# Patient Record
Sex: MALE | Race: WHITE | NOT HISPANIC OR LATINO
[De-identification: names, ages, dates, MRNs, and addresses within clinical notes are randomized per-mention and may not be internally consistent; named-entity substitution may affect disease eponyms.]

---

## 2018-05-02 ENCOUNTER — APPOINTMENT (OUTPATIENT)
Dept: ORTHOPEDIC SURGERY | Facility: CLINIC | Age: 63
End: 2018-05-02
Payer: COMMERCIAL

## 2018-05-02 PROCEDURE — 99213 OFFICE O/P EST LOW 20 MIN: CPT

## 2018-05-02 PROCEDURE — 73522 X-RAY EXAM HIPS BI 3-4 VIEWS: CPT

## 2018-05-08 ENCOUNTER — FORM ENCOUNTER (OUTPATIENT)
Age: 63
End: 2018-05-08

## 2018-05-09 ENCOUNTER — APPOINTMENT (OUTPATIENT)
Dept: MRI IMAGING | Facility: CLINIC | Age: 63
End: 2018-05-09

## 2018-05-09 ENCOUNTER — OUTPATIENT (OUTPATIENT)
Dept: OUTPATIENT SERVICES | Facility: HOSPITAL | Age: 63
LOS: 1 days | End: 2018-05-09
Payer: COMMERCIAL

## 2018-05-09 DIAGNOSIS — Z00.8 ENCOUNTER FOR OTHER GENERAL EXAMINATION: ICD-10-CM

## 2018-05-09 DIAGNOSIS — Z96.60 PRESENCE OF UNSPECIFIED ORTHOPEDIC JOINT IMPLANT: Chronic | ICD-10-CM

## 2018-05-09 PROCEDURE — 73721 MRI JNT OF LWR EXTRE W/O DYE: CPT

## 2018-05-09 PROCEDURE — 73721 MRI JNT OF LWR EXTRE W/O DYE: CPT | Mod: 26,LT

## 2018-06-06 ENCOUNTER — OUTPATIENT (OUTPATIENT)
Dept: OUTPATIENT SERVICES | Facility: HOSPITAL | Age: 63
LOS: 1 days | End: 2018-06-06
Payer: COMMERCIAL

## 2018-06-06 ENCOUNTER — APPOINTMENT (OUTPATIENT)
Dept: ULTRASOUND IMAGING | Facility: CLINIC | Age: 63
End: 2018-06-06
Payer: COMMERCIAL

## 2018-06-06 DIAGNOSIS — Z96.60 PRESENCE OF UNSPECIFIED ORTHOPEDIC JOINT IMPLANT: Chronic | ICD-10-CM

## 2018-06-06 DIAGNOSIS — Z00.8 ENCOUNTER FOR OTHER GENERAL EXAMINATION: ICD-10-CM

## 2018-06-06 PROCEDURE — 20611 DRAIN/INJ JOINT/BURSA W/US: CPT | Mod: LT

## 2018-06-06 PROCEDURE — 20611 DRAIN/INJ JOINT/BURSA W/US: CPT

## 2018-07-06 ENCOUNTER — APPOINTMENT (OUTPATIENT)
Dept: ORTHOPEDIC SURGERY | Facility: CLINIC | Age: 63
End: 2018-07-06
Payer: COMMERCIAL

## 2018-07-06 PROCEDURE — 99214 OFFICE O/P EST MOD 30 MIN: CPT | Mod: 25

## 2018-07-06 PROCEDURE — 20610 DRAIN/INJ JOINT/BURSA W/O US: CPT | Mod: RT

## 2018-07-06 PROCEDURE — 73030 X-RAY EXAM OF SHOULDER: CPT | Mod: RT

## 2018-08-10 ENCOUNTER — APPOINTMENT (OUTPATIENT)
Dept: ORTHOPEDIC SURGERY | Facility: CLINIC | Age: 63
End: 2018-08-10
Payer: COMMERCIAL

## 2018-08-10 VITALS
SYSTOLIC BLOOD PRESSURE: 116 MMHG | WEIGHT: 192 LBS | BODY MASS INDEX: 29.1 KG/M2 | DIASTOLIC BLOOD PRESSURE: 80 MMHG | HEART RATE: 71 BPM | HEIGHT: 68 IN

## 2018-08-10 DIAGNOSIS — M17.10 UNILATERAL PRIMARY OSTEOARTHRITIS, UNSPECIFIED KNEE: ICD-10-CM

## 2018-08-10 DIAGNOSIS — Z82.61 FAMILY HISTORY OF ARTHRITIS: ICD-10-CM

## 2018-08-10 PROCEDURE — 99213 OFFICE O/P EST LOW 20 MIN: CPT

## 2018-08-15 ENCOUNTER — APPOINTMENT (OUTPATIENT)
Dept: ORTHOPEDIC SURGERY | Facility: CLINIC | Age: 63
End: 2018-08-15
Payer: COMMERCIAL

## 2018-08-15 VITALS
HEIGHT: 68 IN | DIASTOLIC BLOOD PRESSURE: 86 MMHG | WEIGHT: 192 LBS | SYSTOLIC BLOOD PRESSURE: 132 MMHG | HEART RATE: 76 BPM | BODY MASS INDEX: 29.1 KG/M2

## 2018-08-15 DIAGNOSIS — M25.569 PAIN IN UNSPECIFIED KNEE: ICD-10-CM

## 2018-08-15 DIAGNOSIS — M54.5 LOW BACK PAIN: ICD-10-CM

## 2018-08-15 PROCEDURE — 99214 OFFICE O/P EST MOD 30 MIN: CPT

## 2018-09-13 ENCOUNTER — APPOINTMENT (OUTPATIENT)
Dept: ORTHOPEDIC SURGERY | Facility: CLINIC | Age: 63
End: 2018-09-13
Payer: COMMERCIAL

## 2018-09-13 VITALS
WEIGHT: 192 LBS | SYSTOLIC BLOOD PRESSURE: 127 MMHG | HEIGHT: 68 IN | BODY MASS INDEX: 29.1 KG/M2 | DIASTOLIC BLOOD PRESSURE: 82 MMHG | HEART RATE: 82 BPM

## 2018-09-13 PROCEDURE — 99213 OFFICE O/P EST LOW 20 MIN: CPT

## 2018-09-13 RX ORDER — MELOXICAM 15 MG/1
15 TABLET ORAL DAILY
Qty: 30 | Refills: 0 | Status: DISCONTINUED | COMMUNITY
Start: 2018-08-10 | End: 2018-09-13

## 2018-09-20 ENCOUNTER — CHART COPY (OUTPATIENT)
Age: 63
End: 2018-09-20

## 2018-09-20 RX ORDER — DICLOFENAC SODIUM 75 MG/1
75 TABLET, DELAYED RELEASE ORAL
Qty: 60 | Refills: 0 | Status: ACTIVE | COMMUNITY
Start: 2018-09-20 | End: 1900-01-01

## 2018-10-11 ENCOUNTER — APPOINTMENT (OUTPATIENT)
Dept: MRI IMAGING | Facility: CLINIC | Age: 63
End: 2018-10-11

## 2018-10-16 ENCOUNTER — FORM ENCOUNTER (OUTPATIENT)
Age: 63
End: 2018-10-16

## 2018-10-17 ENCOUNTER — APPOINTMENT (OUTPATIENT)
Dept: MRI IMAGING | Facility: CLINIC | Age: 63
End: 2018-10-17
Payer: COMMERCIAL

## 2018-10-17 ENCOUNTER — OUTPATIENT (OUTPATIENT)
Dept: OUTPATIENT SERVICES | Facility: HOSPITAL | Age: 63
LOS: 1 days | End: 2018-10-17
Payer: COMMERCIAL

## 2018-10-17 DIAGNOSIS — Z96.60 PRESENCE OF UNSPECIFIED ORTHOPEDIC JOINT IMPLANT: Chronic | ICD-10-CM

## 2018-10-17 DIAGNOSIS — M75.41 IMPINGEMENT SYNDROME OF RIGHT SHOULDER: ICD-10-CM

## 2018-10-17 PROCEDURE — 73221 MRI JOINT UPR EXTREM W/O DYE: CPT | Mod: 26,RT

## 2018-10-17 PROCEDURE — 73221 MRI JOINT UPR EXTREM W/O DYE: CPT

## 2018-10-22 ENCOUNTER — RESULT REVIEW (OUTPATIENT)
Age: 63
End: 2018-10-22

## 2018-11-01 ENCOUNTER — APPOINTMENT (OUTPATIENT)
Dept: ORTHOPEDIC SURGERY | Facility: CLINIC | Age: 63
End: 2018-11-01
Payer: COMMERCIAL

## 2018-11-01 VITALS
DIASTOLIC BLOOD PRESSURE: 80 MMHG | BODY MASS INDEX: 27.58 KG/M2 | HEIGHT: 68 IN | SYSTOLIC BLOOD PRESSURE: 136 MMHG | WEIGHT: 182 LBS | HEART RATE: 80 BPM

## 2018-11-01 DIAGNOSIS — M75.41 IMPINGEMENT SYNDROME OF RIGHT SHOULDER: ICD-10-CM

## 2018-11-01 PROCEDURE — 99213 OFFICE O/P EST LOW 20 MIN: CPT | Mod: 25

## 2018-11-01 PROCEDURE — 20611 DRAIN/INJ JOINT/BURSA W/US: CPT | Mod: RT

## 2019-05-24 ENCOUNTER — APPOINTMENT (OUTPATIENT)
Dept: ORTHOPEDIC SURGERY | Facility: CLINIC | Age: 64
End: 2019-05-24
Payer: COMMERCIAL

## 2019-05-24 VITALS — WEIGHT: 182 LBS | BODY MASS INDEX: 27.58 KG/M2 | HEIGHT: 68 IN

## 2019-05-24 DIAGNOSIS — M17.12 UNILATERAL PRIMARY OSTEOARTHRITIS, LEFT KNEE: ICD-10-CM

## 2019-05-24 PROCEDURE — 99213 OFFICE O/P EST LOW 20 MIN: CPT

## 2019-05-24 PROCEDURE — 73522 X-RAY EXAM HIPS BI 3-4 VIEWS: CPT

## 2019-05-24 PROCEDURE — 73564 X-RAY EXAM KNEE 4 OR MORE: CPT | Mod: LT

## 2019-05-24 NOTE — PHYSICAL EXAM
[de-identified] : 4 views of the right left knee show the right knee has normal alignment joint space maintained patella tracks well his left knee has medial and patellofemoral osteoarthritis.  Marked knee medial compartment wear as well as a peripheral patellofemoral wear. [FreeTextEntry2] : Status patient still has some discomfort and with his left hip but really his left knee is what bothers him the most he has had injections with hyaluronic acid and he has had a platelet rich plasma but he still has the symptoms he does feel that he has some weakness when flexing his left leg and this may be aggravated because of his knee.  His knee at this time is the same as before he has definite arthritis crepitus behind the patella discomfort over his medial joint line and some patellofemoral crepitus and pain.  His left knee motion still is satisfactory going from 0 to past 125 degrees he does have an effusion in his knee but he has been injected with hyaluronic acid.\par \par \par His hips at this time continue to move very well. He has flexion symmetrically to 120, abduction to 70°, adduction of 20°, external rotation is 60° and internal rotation of 20°.  Patient also does have good history of degenerative lumbar disc.  At this time however his hip motion is doing well on the left but still gives him some pain more when flexing his leg and it may be due to ileus.

## 2019-05-24 NOTE — HISTORY OF PRESENT ILLNESS
[de-identified] : Pt is a 62 y/o male s/p L THR 10/19/15. He was diagnosed with bursitis last visit c/p persistent left hip pain and bilateral knee pain.\par Difficulty going up and down stairs at times.\par Has had 4/5 viscous supplementation in Left knee from an arthritis center\par Hx of knee scope on left 30yrs ago.  He states that the injections helped for a few weeks but then the pain returned. He has not been seen by a Rheumatologist. He presents today to discuss his options. \par

## 2019-05-24 NOTE — DISCUSSION/SUMMARY
[de-identified] : He will get a [facility] MRI of his left hip and if he has tendinitis that it injection can be done by radiology at that time.  At this time we are watching his knee in the future he may well benefit from left total knee replacement.  Return visit in 3 to 4 months.

## 2019-05-24 NOTE — REASON FOR VISIT
[Artificial Hip Joint] : artificial hip joint [Follow-Up Visit] : a follow-up visit for [Knee Pain] : knee pain

## 2019-06-14 ENCOUNTER — APPOINTMENT (OUTPATIENT)
Dept: MRI IMAGING | Facility: CLINIC | Age: 64
End: 2019-06-14

## 2019-09-17 ENCOUNTER — APPOINTMENT (OUTPATIENT)
Dept: ORTHOPEDIC SURGERY | Facility: CLINIC | Age: 64
End: 2019-09-17
Payer: COMMERCIAL

## 2019-09-17 VITALS — BODY MASS INDEX: 27.58 KG/M2 | WEIGHT: 182 LBS | HEIGHT: 68 IN

## 2019-09-17 DIAGNOSIS — Z96.642 PRESENCE OF LEFT ARTIFICIAL HIP JOINT: ICD-10-CM

## 2019-09-17 DIAGNOSIS — Z96.641 PRESENCE OF RIGHT ARTIFICIAL HIP JOINT: ICD-10-CM

## 2019-09-17 PROCEDURE — 99213 OFFICE O/P EST LOW 20 MIN: CPT

## 2019-09-17 NOTE — HISTORY OF PRESENT ILLNESS
[de-identified] : Pt is a 65 y/o male s/p L THR 10/19/15. \par He was diagnosed with bursitis of his left hip c/o persistent left hip pain and left knee pain.\par Difficulty going up and down stairs at times.\par Hx of knee scope on left 30 years ago. \par Has had viscous supplementation in left knee from an arthritis center\par He states that the injections helped for a few weeks but then the pain returned. \par He has not been seen by a Rheumatologist.\par Patient did over 6 weeks of PT for his left hip, which didn't help.\par His MRI of the left hip was initially denied due to lack of 6 weeks or more of PT.\par He is here today for an updated script for his left hip to obtain his MRI.

## 2019-09-17 NOTE — DISCUSSION/SUMMARY
[de-identified] : He will get a [facility] MRI of his left hip and if he has tendinitis that it injection can be done by radiology at that time.

## 2019-09-17 NOTE — PHYSICAL EXAM
[FreeTextEntry2] : Patient continues have some discomfort in his left hip.  The patient at this time was going to have a MRI which was a [facility] however this was held because he needed physical therapy first he has had the physical therapy his condition is actually is not changed.  He has the pain in his left hip.  With a very good motion as before.  I feel he should definitely have the [facility] and at that time a tendinitis or bursitis could be injected.  He is in agreement with the plan.  \par \par \par His hips at this time continue to move very well. He has flexion symmetrically to 120, abduction to 70°, adduction of 20°, external rotation is 60° and internal rotation of 20°.

## 2019-09-18 ENCOUNTER — OTHER (OUTPATIENT)
Age: 64
End: 2019-09-18

## 2019-09-26 ENCOUNTER — OTHER (OUTPATIENT)
Age: 64
End: 2019-09-26

## 2019-09-26 ENCOUNTER — FORM ENCOUNTER (OUTPATIENT)
Age: 64
End: 2019-09-26

## 2019-09-27 ENCOUNTER — OUTPATIENT (OUTPATIENT)
Dept: OUTPATIENT SERVICES | Facility: HOSPITAL | Age: 64
LOS: 1 days | End: 2019-09-27
Payer: COMMERCIAL

## 2019-09-27 ENCOUNTER — APPOINTMENT (OUTPATIENT)
Dept: ULTRASOUND IMAGING | Facility: CLINIC | Age: 64
End: 2019-09-27
Payer: COMMERCIAL

## 2019-09-27 ENCOUNTER — APPOINTMENT (OUTPATIENT)
Dept: MRI IMAGING | Facility: CLINIC | Age: 64
End: 2019-09-27

## 2019-09-27 DIAGNOSIS — Z96.642 PRESENCE OF LEFT ARTIFICIAL HIP JOINT: ICD-10-CM

## 2019-09-27 DIAGNOSIS — Z96.60 PRESENCE OF UNSPECIFIED ORTHOPEDIC JOINT IMPLANT: Chronic | ICD-10-CM

## 2019-09-27 PROCEDURE — 20611 DRAIN/INJ JOINT/BURSA W/US: CPT

## 2019-09-27 PROCEDURE — 73721 MRI JNT OF LWR EXTRE W/O DYE: CPT | Mod: 26,LT

## 2019-09-27 PROCEDURE — 73721 MRI JNT OF LWR EXTRE W/O DYE: CPT

## 2019-09-27 PROCEDURE — 20611 DRAIN/INJ JOINT/BURSA W/US: CPT | Mod: LT

## 2019-10-10 ENCOUNTER — APPOINTMENT (OUTPATIENT)
Dept: ORTHOPEDIC SURGERY | Facility: CLINIC | Age: 64
End: 2019-10-10

## 2020-11-25 ENCOUNTER — NON-APPOINTMENT (OUTPATIENT)
Age: 65
End: 2020-11-25